# Patient Record
(demographics unavailable — no encounter records)

---

## 2025-01-22 NOTE — PHYSICAL EXAM
[Normal Appearance] : normal appearance [General Appearance - In No Acute Distress] : no acute distress [Oriented To Time, Place, And Person] : oriented to person, place, and time [] : no respiratory distress [Normal Station and Gait] : the gait and station were normal for the patient's age

## 2025-04-21 NOTE — PHYSICAL EXAM
[Normal Appearance] : normal appearance [General Appearance - In No Acute Distress] : no acute distress [] : no respiratory distress [Oriented To Time, Place, And Person] : oriented to person, place, and time [Normal Station and Gait] : the gait and station were normal for the patient's age [de-identified] : normal peripheral circulation

## 2025-04-21 NOTE — ASSESSMENT
[FreeTextEntry1] : Gross hematuria: Will get Urinalysis, Urine culture and Urine cytology.  Will get records from PCP's office.    Discussed possibility of doing repeat CT urogram and cystoscopy.   Will let patient know after review of records.    Erectile dysfunction: Patient was taking Tadalafil as needed. Was recommended to take Tadalafil 5 mg QD to possibly help with enlarged prostate, congestion, and to prevent gross hematuria.   Hypogonadism: Will continue testosterone 400 mg every 2 weeks.   Will get full panel fasting labs: Total Testosterone, Complete blood count, Comprehensive metabolic panel, Hgb A1c, Lipid profile, PSA and Estradiol after 6 injections.   Return to office in 2 weeks or sooner if there are any issues.

## 2025-04-21 NOTE — ADDENDUM
[FreeTextEntry1] :  Tiffany DELONG assisted in documentation on 04/16/2025  acting as a scribe for Dr. Soren Garcia.

## 2025-04-21 NOTE — LETTER BODY
[Dear  ___] : Dear  [unfilled], [Courtesy Letter:] : I had the pleasure of seeing your patient, [unfilled], in my office today. [Please see my note below.] : Please see my note below. [Sincerely,] : Sincerely, [FreeTextEntry3] : Soren Garcia MD  of Urology [facility]  The [facility] Offices: [address] [facility], [address] [place] [address], Pending sale to [facility] 8 [facility], [place] [address]  TEL: [phone] FAX: [phone]

## 2025-04-21 NOTE — HISTORY OF PRESENT ILLNESS
[FreeTextEntry1] : Primary care physician: Dr. Mohsen Vegas.   Follow my health: Active user.   04/16/2025:  43-year-old male presents for follow-up and testosterone injection.   Patient had gross hematuria 3 weeks ago while giving urine specimen at PCP's office.   Patient did have sexual activity the night before.   No other bother with urination.  Denies dysuria, lower abdominal or flank pain, nausea, vomiting, fever, chills or rigors.  Patient did have urine test and RBUS with PCP.    Patient does take Tadalafil 5 mg as needed and has good erections, 5/5.  Mostly needs it towards next dose of testosterone injection. Has good stream, daytime frequency every 2-3 hours. Patient drinks a lot of water.   Nocturia 2-3 times. Not bothered by it.   On in office testosterone injections: Testosterone cypionate 400 mg every 2 weeks. Has improved energy levels and libido. However, towards the next injection does feel a dip in his energy levels.  Denies any breast enlargement or tenderness.  Did do recent labs and was informed of results.   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   Did have negative workup with me, including CT urogram in 11/2022. Did not have cystoscopy at that time.    In November 2022 underwent negative work up for gross hematuria including CT Urogram.  Patient hesitant to undergo Cystoscopy.   CT urogram (11/10/2022): KIDNEYS/URETERS: No renal stones or hydronephrosis.  Bilateral sub centimeter renal cysts.  No suspicious renal mass or evidence of a urothelial lesion. BLADDER: Within normal limits. REPRODUCTIVE ORGANS: Prostate within normal limits.  Seen on 11/2/22 for Hypogonadism.  Recently found to have low Testosterone checked for feeling tired, low libido and erectile dysfunction.  Rated erections 2/5. Had difficulty to attain, maintain and penetrate.  Normal ejaculation.  Reported good stream, urinates every 2 hours or so during the day. Nocturia of 3 x.  Denied hesitancy, straining, intermittency, urgency, incontinence, sense of incomplete emptying. Denied dysuria, lower abdominal or flank pain, fever, chills or rigors. Had noticed blood tinged urine.  No personal history or family history of kidney stone.  Family history of Prostate cancer: Maternal uncle.

## 2025-04-21 NOTE — PHYSICAL EXAM
[Normal Appearance] : normal appearance [General Appearance - In No Acute Distress] : no acute distress [] : no respiratory distress [Oriented To Time, Place, And Person] : oriented to person, place, and time [Normal Station and Gait] : the gait and station were normal for the patient's age [de-identified] : normal peripheral circulation

## 2025-04-21 NOTE — LETTER BODY
[Dear  ___] : Dear  [unfilled], [Courtesy Letter:] : I had the pleasure of seeing your patient, [unfilled], in my office today. [Please see my note below.] : Please see my note below. [Sincerely,] : Sincerely, [FreeTextEntry3] : Soren Garcia MD  of Urology [facility]  The [facility] Offices: [address] [facility], [address] [place] [address], [facility] [address] [facility], [place] [address]  TEL: [phone] FAX: [phone]

## 2025-04-21 NOTE — END OF VISIT
[Time Spent: ___ minutes] : I have spent [unfilled] minutes of time on the encounter which excludes teaching and separately reported services. [FreeTextEntry3] : Parts of this note were generated by using [employer] services and Dragon medical dictation software. A reasonable effort was made to proofread and correct its contents, but typos and mistakes may still remain. If there are any questions or points of clarification needed, please contact my office.    Prior to appointment and during encounter with patient extensive medical records were reviewed including but not limited to, hospital records, outpatient records, imaging results and lab data if available. During this appointment the patient was examined, diagnoses were discussed and explained in a face-to-face manner. In addition, extensive time was spent reviewing aforementioned diagnostic studies. Counseling including test results, differential diagnoses, treatment options, risk and benefits, lifestyle changes, current condition, and current medications was performed. Patient's questions and concerns were addressed. Patient verbalized understanding of the treatment plan. Time spent is for reviewing chart, labs and images if available, counseling and care coordination.

## 2025-04-21 NOTE — END OF VISIT
[Time Spent: ___ minutes] : I have spent [unfilled] minutes of time on the encounter which excludes teaching and separately reported services. [FreeTextEntry3] : Parts of this note were generated by using [employer] services and Dragon medical dictation software. A reasonable effort was made to proofread and correct its contents, but typos and mistakes may still remain. If there are any questions or points of clarification needed, please contact my office.    Prior to appointment and during encounter with patient extensive medical records were reviewed including but not limited to, hospital records, outpatient records, imaging results and lab data if available. During this appointment the patient was examined, diagnoses were discussed and explained in a face-to-face manner. In addition, extensive time was spent reviewing aforementioned diagnostic studies. Counseling including test results, differential diagnoses, treatment options, risk and benefits, lifestyle changes, current condition, and current medications was performed. Patient's questions and concerns were addressed. Patient verbalized understanding of the treatment plan. Time spent is for reviewing chart, labs and images if available, counseling and care coordination.

## 2025-04-30 NOTE — LETTER BODY
[Dear  ___] : Dear  [unfilled], [Courtesy Letter:] : I had the pleasure of seeing your patient, [unfilled], in my office today. [Please see my note below.] : Please see my note below. [Sincerely,] : Sincerely, [FreeTextEntry3] : Soren Garcia MD  of Urology [facility]  The [facility] Offices: [address] [facility], [address] [place] [address], [facility] 8 [facility], [place] [address]  TEL: [phone] FAX: [phone]

## 2025-04-30 NOTE — PHYSICAL EXAM
[Normal Appearance] : normal appearance [General Appearance - In No Acute Distress] : no acute distress [] : no respiratory distress [Oriented To Time, Place, And Person] : oriented to person, place, and time [Normal Station and Gait] : the gait and station were normal for the patient's age [de-identified] : normal peripheral circulation

## 2025-04-30 NOTE — END OF VISIT
[FreeTextEntry3] : Parts of this note were generated by using "[employer] services and Dragon medical dictation software. A reasonable effort was made to proofread and correct its contents, but typos and mistakes may still remain. If there are any questions or points of clarification needed, please contact my office.    Prior to appointment and during encounter with patient extensive medical records were reviewed including but not limited to, hospital records, outpatient records, imaging results and lab data if available. During this appointment the patient was examined, diagnoses were discussed and explained in a face-to-face manner. In addition, extensive time was spent reviewing aforementioned diagnostic studies. Counseling including test results, differential diagnoses, treatment options, risk and benefits, lifestyle changes, current condition, and current medications was performed. Patient's questions and concerns were addressed. Patient verbalized understanding of the treatment plan. Time spent is for reviewing chart, labs and images if available, counseling and care coordination.  [Time Spent: ___ minutes] : I have spent [unfilled] minutes of time on the encounter which excludes teaching and separately reported services.

## 2025-05-14 NOTE — ASSESSMENT
[FreeTextEntry1] : 05/14/2025:  Discussed workup so far.    Recommended cystoscopy.   Patient is hesitant. If he has a recurrence of gross hematuria, will proceed.   Discussed renal cyst.  Discussed prostatic calcification.   Was also present in 2022. However, no mention on the report.   Discussed non-incidental findings.    Patient received third injection of the new cycle.   Will do full panel testosterone labs after 6 injections.     Return to office in 2 weeks or sooner if there are any issues for testosterone injection. Will do uroflow and PVR.

## 2025-05-14 NOTE — PHYSICAL EXAM
[Normal Appearance] : normal appearance [General Appearance - In No Acute Distress] : no acute distress [] : no respiratory distress [Oriented To Time, Place, And Person] : oriented to person, place, and time [Normal Station and Gait] : the gait and station were normal for the patient's age [de-identified] : normal peripheral circulation

## 2025-05-14 NOTE — END OF VISIT
[Time Spent: ___ minutes] : I have spent [unfilled] minutes of time on the encounter which excludes teaching and separately reported services. [FreeTextEntry3] : Parts of this note were generated by using [employer] services and Dragon medical dictation software. A reasonable effort was made to proofread and correct its contents, but typos and mistakes may still remain. If there are any questions or points of clarification needed, please contact my office.    Prior to appointment and during encounter with patient extensive medical records were reviewed including but not limited to, hospital records, outpatient records, imaging results and lab data if available. During this appointment the patient was examined, diagnoses were discussed and explained in a face-to-face manner. In addition, extensive time was spent reviewing aforementioned diagnostic studies. Counseling including test results, differential diagnoses, treatment options, risk and benefits, lifestyle changes, current condition, and current medications was performed. Patient's questions and concerns were addressed. Patient verbalized understanding of the treatment plan. Time spent is for reviewing chart, labs and images if available, counseling and care coordination.

## 2025-05-14 NOTE — HISTORY OF PRESENT ILLNESS
[FreeTextEntry1] : Primary care physician: Dr. Mohsen Vegas.   Follow my health: Active user.   05/14/2025: 43-year-old male presents for testosterone injection and possible cystoscopy.   Patient has not had another episode of gross hematuria.   No other change.   Did do CT urogram .   4/30/2025: 43-year-old male presents for follow-up and testosterone injection.   Taking tadalafil 5 mg daily. No new episode of gross hematuria. Patient brought in report of renal ultrasound.  Bladder ultrasound report still pending.  Renal ultrasound (4/16/2025): No evidence of calculus or hydronephrosis.  Unremarkable study.  Seen on 04/16/2025 for follow-up and testosterone injection.   Patient had gross hematuria 3 weeks ago while giving urine specimen at PCP's office.   Patient did have sexual activity the night before.   No other bother with urination.  Denied dysuria, lower abdominal or flank pain, nausea, vomiting, fever, chills or rigors.  Patient did have urine test and RBUS with PCP.    Took Tadalafil 5 mg as needed and had good erections, 5/5.  Mostly needed it towards next dose of testosterone injection. Had good stream, daytime frequency every 2-3 hours. Patient drank a lot of water.   Nocturia 2-3 times. Not bothered by it.   On in office testosterone injections: Testosterone cypionate 400 mg every 2 weeks. Had improved energy levels and libido. However, towards the next injection does feel a dip in his energy levels.  Denied any breast enlargement or tenderness.  Did do recent labs and was informed of results.   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   Did have negative workup with me, including CT urogram in 11/2022. Did not have cystoscopy at that time.    In November 2022 underwent negative work up for gross hematuria including CT Urogram.  Patient hesitant to undergo Cystoscopy.   CT urogram (11/10/2022): KIDNEYS/URETERS: No renal stones or hydronephrosis.  Bilateral sub centimeter renal cysts.  No suspicious renal mass or evidence of a urothelial lesion. BLADDER: Within normal limits. REPRODUCTIVE ORGANS: Prostate within normal limits.  Seen on 11/2/22 for Hypogonadism.  Recently found to have low Testosterone checked for feeling tired, low libido and erectile dysfunction.  Rated erections 2/5. Had difficulty to attain, maintain and penetrate.  Normal ejaculation.  Reported good stream, urinates every 2 hours or so during the day. Nocturia of 3 x.  Denied hesitancy, straining, intermittency, urgency, incontinence, sense of incomplete emptying. Denied dysuria, lower abdominal or flank pain, fever, chills or rigors. Had noticed blood tinged urine.  No personal history or family history of kidney stone.  Family history of Prostate cancer: Maternal uncle.

## 2025-05-14 NOTE — ADDENDUM
[FreeTextEntry1] :  Tiffany DELONG assisted in documentation on 05/14/2025  acting as a scribe for Dr. Soren Garcia.

## 2025-05-14 NOTE — LETTER BODY
[Dear  ___] : Dear  [unfilled], [Courtesy Letter:] : I had the pleasure of seeing your patient, [unfilled], in my office today. [Please see my note below.] : Please see my note below. [Sincerely,] : Sincerely, [FreeTextEntry3] : Soren Garcia MD  of Urology [facility]  The [facility] Offices: [address] [facility], [address] [place] [address], [facility] 8 [facility], [place] [address]  TEL: [phone] FAX: [phone]

## 2025-07-24 NOTE — HISTORY OF PRESENT ILLNESS
[FreeTextEntry1] : Primary care physician: Dr. Mohsen Vegas.   Follow my health: Active user.   07/23/2025: 43-year-old male presents for follow-up and testosterone injection.   Patient did do recent labs 8 days after his 7th injection.  Reports good energy and libido. Denies any breast enlargement or tenderness.  Taking Tadalafil 5 mg daily.   No new episodes of gross hematuria.   05/14/2025: 43-year-old male presents for testosterone injection and possible cystoscopy.   Patient has not had another episode of gross hematuria.   No other change.   Did do CT urogram .   Seen on 4/30/2025 for follow-up and testosterone injection.   Taking Tadalafil 5 mg daily. No new episode of gross hematuria. Patient brought in report of renal ultrasound.  Bladder ultrasound report still pending.  Renal ultrasound (4/16/2025): No evidence of calculus or hydronephrosis.  Unremarkable study.  Seen on 04/16/2025 for follow-up and testosterone injection.   Patient had gross hematuria 3 weeks ago while giving urine specimen at PCP's office.   Patient did have sexual activity the night before.   No other bother with urination.  Denied dysuria, lower abdominal or flank pain, nausea, vomiting, fever, chills or rigors.  Patient did have urine test and RBUS with PCP.    Took Tadalafil 5 mg as needed and had good erections, 5/5.  Mostly needed it towards next dose of testosterone injection. Had good stream, daytime frequency every 2-3 hours. Patient drank a lot of water.   Nocturia 2-3 times. Not bothered by it.   On in office testosterone injections: Testosterone cypionate 400 mg every 2 weeks. Had improved energy levels and libido. However, towards the next injection does feel a dip in his energy levels.  Denied any breast enlargement or tenderness.  Did do recent labs and was informed of results.   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   Did have negative workup with me, including CT urogram in 11/2022. Did not have cystoscopy at that time.    In November 2022 underwent negative work up for gross hematuria including CT Urogram.  Patient hesitant to undergo Cystoscopy.   CT urogram (11/10/2022): KIDNEYS/URETERS: No renal stones or hydronephrosis.  Bilateral sub centimeter renal cysts.  No suspicious renal mass or evidence of a urothelial lesion. BLADDER: Within normal limits. REPRODUCTIVE ORGANS: Prostate within normal limits.  Seen on 11/2/22 for Hypogonadism.  Recently found to have low Testosterone checked for feeling tired, low libido and erectile dysfunction.  Rated erections 2/5. Had difficulty to attain, maintain and penetrate.  Normal ejaculation.  Reported good stream, urinates every 2 hours or so during the day. Nocturia of 3 x.  Denied hesitancy, straining, intermittency, urgency, incontinence, sense of incomplete emptying. Denied dysuria, lower abdominal or flank pain, fever, chills or rigors. Had noticed blood tinged urine.  No personal history or family history of kidney stone.  Family history of Prostate cancer: Maternal uncle. 
GENERAL: Alert. No acute distress.   EYES: EOMI grossly normal. Anicteric.   HENT: Moist mucous membranes.   RESP: No conversation dyspnea, no resp distress, CTAB   CARDIOVASCULAR: RRR, no m/g/r. 2+ radial and DP pulses  ABDOMEN: soft, non distended, nttp  MSK: Able to use BUE below the elbow. Pt appears to avoid movement of shoulders. Left shoulder with significant crepitus to palpation. No redness or warmth overlying. No skin changes. limited ROM of bilateral shoulder secondary to pain. 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 Decrease in  strength bilaterally, pt relates this to pain.   PSYCHIATRIC: Cooperative. Appropriate mood and affect

## 2025-07-24 NOTE — LETTER BODY
[Dear  ___] : Dear  [unfilled], [Courtesy Letter:] : I had the pleasure of seeing your patient, [unfilled], in my office today. [Please see my note below.] : Please see my note below. [Sincerely,] : Sincerely, [FreeTextEntry3] : Soren Garcia MD  of Urology [facility]  The [facility] Offices: [address]", [address] [place] [address], [facility] 8 [facility], [place] [address]  TEL: [phone] FAX: [phone]

## 2025-07-24 NOTE — PHYSICAL EXAM
[Normal Appearance] : normal appearance [General Appearance - In No Acute Distress] : no acute distress [] : no respiratory distress [Normal Station and Gait] : the gait and station were normal for the patient's age [Oriented To Time, Place, And Person] : oriented to person, place, and time [de-identified] : normal peripheral circulation

## 2025-07-24 NOTE — END OF VISIT
[Time Spent: ___ minutes] : I have spent [unfilled] minutes of time on the encounter which excludes teaching and separately reported services. [FreeTextEntry3] : Parts of this note were generated by using [employer] services and Dragon medical dictation software. A reasonable effort was made to proofread and correct its contents, but typos and mistakes may still remain. If there are any questions or points of clarification needed, please contact my office.    Prior to appointment and during encounter with patient extensive medical records were reviewed including but not limited to, hospital records, outpatient records, imaging results and lab data if available. During this appointment the patient was examined, diagnoses were discussed and explained in a face-to-face manner. In addition, extensive time was spent reviewing aforementioned diagnostic studies. Counseling including test results, differential diagnoses, treatment options, risk and benefits, lifestyle changes, current condition, and current medications was performed. Patient's questions and concerns were addressed. Patient verbalized understanding of the treatment plan. Time spent is for reviewing chart, labs and images if available, counseling and care coordination.

## 2025-07-24 NOTE — PHYSICAL EXAM
[Normal Appearance] : normal appearance [General Appearance - In No Acute Distress] : no acute distress [] : no respiratory distress [Normal Station and Gait] : the gait and station were normal for the patient's age [Oriented To Time, Place, And Person] : oriented to person, place, and time [de-identified] : normal peripheral circulation

## 2025-07-24 NOTE — END OF VISIT
[Time Spent: ___ minutes] : I have spent [unfilled] minutes of time on the encounter which excludes teaching and separately reported services. [FreeTextEntry3] : Parts of this note were generated by using [employer] services and Dragon medical dictation software. A reasonable effort was made to proofread and correct its contents, but typos and mistakes may still remain. If there are any questions or points of clarification needed, please contact my office.    Prior to appointment and during encounter with patient extensive medical records were reviewed including but not limited to, hospital records, outpatient records, imaging results and lab data if available. During this appointment the patient was examined, diagnoses were discussed and explained in a face-to-face manner. In addition, extensive time was spent reviewing aforementioned diagnostic studies. Counseling including test results, differential diagnoses, treatment options, risk and benefits, lifestyle changes, current condition, and current medications was performed. Patient's questions and concerns were addressed. Patient verbalized understanding of the treatment plan. Time spent is for reviewing chart, labs and images if available, counseling and care coordination.

## 2025-07-24 NOTE — ADDENDUM
[FreeTextEntry1] :  Tiffany DELONG assisted in documentation on 07/24/2025  acting as a scribe for Dr. Soren Garcia.

## 2025-07-24 NOTE — ASSESSMENT
[FreeTextEntry1] : 07/23/2025: Reviewed labs with patient.   Discussed treatment options.  Will continue testosterone cypionate 400 mg every 2 weeks.   Will repeat labs: total testosterone, Hgb, hematocrit after 6 injections.   Will continue Tadalafil 5 mg qd.    Return to office in 2 weeks or sooner if there are any issues.

## 2025-07-24 NOTE — END OF VISIT
[Time Spent: ___ minutes] : I have spent [unfilled] minutes of time on the encounter which excludes teaching and separately reported services. [FreeTextEntry3] : Parts of this note were generated by using [employer] services and Dragon medical dictation software. A reasonable effort was made to proofread and correct its contents, but typos and mistakes may still remain. If there are any questions or points of clarification needed, please contact my office.    Prior to appointment and during encounter with patient extensive medical records were reviewed including but not limited to, hospital records, outpatient records, imaging results and lab data if available. During this appointment the patient was examined, diagnoses were discussed and explained in a face-to-face manner. In addition, extensive time was spent reviewing aforementioned diagnostic studies. Counseling including test results, differential diagnoses, treatment options, risk and benefits, lifestyle changes, current condition, and current medications was performed. Patient's questions and concerns were addressed. Patient verbalized understanding of the treatment plan. Time spent is for reviewing chart, labs and images if available, counseling and care coordination.

## 2025-07-24 NOTE — LETTER BODY
[Dear  ___] : Dear  [unfilled], [Courtesy Letter:] : I had the pleasure of seeing your patient, [unfilled], in my office today. [Please see my note below.] : Please see my note below. [Sincerely,] : Sincerely, [FreeTextEntry3] : Soren Garcia MD  of Urology [facility]  [facility] Offices: [address] [facility], [address] [place] [address], [facility] [address] [facility], [place] [address]  TEL: [phone] FAX: [phone]

## 2025-07-24 NOTE — LETTER BODY
[Dear  ___] : Dear  [unfilled], [Courtesy Letter:] : I had the pleasure of seeing your patient, [unfilled], in my office today. [Please see my note below.] : Please see my note below. [Sincerely,] : Sincerely, [FreeTextEntry3] : Soren Garcia MD  of Urology [facility]  [facility] Offices: [address] [facility], [address] [place] [address], [facility] [address] [facility], [place], [place] [address]  TEL: [phone] FAX: [phone]

## 2025-07-24 NOTE — PHYSICAL EXAM
[Normal Appearance] : normal appearance [General Appearance - In No Acute Distress] : no acute distress [] : no respiratory distress [Normal Station and Gait] : the gait and station were normal for the patient's age [Oriented To Time, Place, And Person] : oriented to person, place, and time [de-identified] : normal peripheral circulation